# Patient Record
Sex: FEMALE | Race: WHITE
[De-identification: names, ages, dates, MRNs, and addresses within clinical notes are randomized per-mention and may not be internally consistent; named-entity substitution may affect disease eponyms.]

---

## 2018-12-16 ENCOUNTER — HOSPITAL ENCOUNTER (EMERGENCY)
Dept: HOSPITAL 39 - ER | Age: 33
Discharge: HOME | End: 2018-12-16
Payer: COMMERCIAL

## 2018-12-16 VITALS — DIASTOLIC BLOOD PRESSURE: 68 MMHG | TEMPERATURE: 97.9 F | SYSTOLIC BLOOD PRESSURE: 102 MMHG | OXYGEN SATURATION: 98 %

## 2018-12-16 DIAGNOSIS — K52.9: Primary | ICD-10-CM

## 2018-12-16 DIAGNOSIS — F32.9: ICD-10-CM

## 2018-12-16 DIAGNOSIS — E11.9: ICD-10-CM

## 2018-12-16 DIAGNOSIS — E86.0: ICD-10-CM

## 2018-12-16 PROCEDURE — 87086 URINE CULTURE/COLONY COUNT: CPT

## 2018-12-16 PROCEDURE — 85025 COMPLETE CBC W/AUTO DIFF WBC: CPT

## 2018-12-16 PROCEDURE — 74018 RADEX ABDOMEN 1 VIEW: CPT

## 2018-12-16 PROCEDURE — 80053 COMPREHEN METABOLIC PANEL: CPT

## 2018-12-16 PROCEDURE — 84703 CHORIONIC GONADOTROPIN ASSAY: CPT

## 2018-12-16 PROCEDURE — 82150 ASSAY OF AMYLASE: CPT

## 2018-12-16 PROCEDURE — 87502 INFLUENZA DNA AMP PROBE: CPT

## 2018-12-16 PROCEDURE — 81001 URINALYSIS AUTO W/SCOPE: CPT

## 2018-12-16 PROCEDURE — 83690 ASSAY OF LIPASE: CPT

## 2018-12-16 PROCEDURE — 84132 ASSAY OF SERUM POTASSIUM: CPT

## 2018-12-16 PROCEDURE — 36415 COLL VENOUS BLD VENIPUNCTURE: CPT

## 2018-12-16 NOTE — ED.PDOC
History of Present Illness





- General


Chief Complaint: GI Problem


Time Seen by Provider: 18 14:23


Information Source: patient


Exam Limitations: no limitations





- History of Present Illness


Initial Comments: 


patient comes in today for four-day history of nausea, emesis, and diarrhea.  

Patient states the diarrhea started first on Thursday with slight fever and 

chills subjectively.  Patient has gone multiple times throughout all the days.  

No blood or mucus is reported.  Today and yesterday she had considerable nausea 

with 3 bouts of emesis this morning.  Today also her abdomen started having 

pre-significant cramping although no overt sharp pain.  Patient's states she's 

had no recent ill contacts, travel, or questionable by mouth intake.  She is a 

little bit of nasal congestion but no cough.  Some body aches and overall 

weakness.  Patient is otherwise healthy and has no past medical history.  

Patient has no known drug allergies.  Patient does not believe that she is 

pregnant but is not sure.





Abdominal Pain Onset Location: generalized abdomen


Pain Radiation: no radiation


Quality: moderate, cramping


Timing/Duration: days - 4, getting worse


Improving Factors: nothing


Worsening Factors: eating


Associated Symptoms: diarrhea, fever/chills, nausea/vomiting





Review of Systems





- Review of Systems


Constitutional: States: chills, fever, malaise, weakness


EENTM: States: nose congestion.  Denies: eye pain, ear pain, throat pain


Respiratory: States: no symptoms reported.  Denies: cough, short of breath, 

wheezing


Cardiology: States: no symptoms reported.  Denies: chest pain, edema, 

palpitations


Gastrointestinal/Abdominal: States: see HPI, abdominal pain, diarrhea, nausea, 

vomiting


Genitourinary: States: no symptoms reported.  Denies: dysuria, frequency, 

hematuria


Skin: States: no symptoms reported





Past Medical History (General)





- Patient Medical History


Hx Seizures: No


Hx Stroke: No


Hx Asthma: No


Hx of COPD: No


Hx Cardiac Disorders: No


Hx Congestive Heart Failure: No


Hx Pacemaker: No


Hx Hypertension: No


Hx Diabetes: Yes


Hx Renal Disease: No


Hx MRSA: No





- Vaccination History


Hx Tetanus, Diphtheria Vaccination: No


Hx Influenza Vaccination: No


Hx Pneumococcal Vaccination: No





- Social History


Hx Tobacco Use: No


Hx Alcohol Use: No


Hx Substance Use: No


Hx Substance Use Treatment: No


Hx Depression: Yes


Hx Physical Abuse: No


Hx Emotional Abuse: No





- Female History


Hx Last Menstrual Period: 14


Patient Pregnant: No


Expected Date of Delivery:: 10/24/13





Family Medical History





- Family History


  ** Father


Name: Ho Wyatt


Age (years): 78


Living Status: Still Living


Hx Cardiac Disease: Yes


Hx Family Cancer: Yes - Meloma





  ** Mother


Name: Nidia Wyatt


Age (years): 74


Living Status: 


Age at Death (years of age): 74


Cause of Death: breast cancer


Hx Family Asthma: Yes


Hx Family Congestive Heart Failure: Yes


Hx Family Hypertension: Yes


Hx Family Stroke: No


Hx Cardiac Disease: Yes


Hx Family Diabetes: No


Hx Family Cancer: Yes - Breast


Age of Onset (years of age): 68





Physical Exam





- Physical Exam


General Appearance: Alert, Ill Appearing


Eyes, Ears, Nose, Throat Exam: PERRL/EOMI, normal ENT inspection, TMs normal, 

pharynx normal - dry mucous membranes


Neck: non-tender, full range of motion, supple


Respiratory: chest non-tender, lungs clear, normal breath sounds


Cardiovascular/Chest: normal peripheral pulses, regular rate, rhythm, no edema, 

no gallop, no murmur


Peripheral Pulses: No deficit


Gastrointestinal/Abdominal: soft, tenderness - diffuse tenderness with no 

rebound no guarding, hypoactive BS, non distended


Back Exam: normal inspection, no CVA tenderness


Neurologic: alert, oriented x 3


Skin Exam: other - poor skin turgor





Progress





- Progress


Progress: 





18 19:46


patient is feeling much better with no cramping and no emesis since arrival.  

slow return to normal diet and K is now normal. 





- Results/Orders


Results/Orders: 





                                        





18 14:40


URINE CULTURE W/COLONY COUNT Stat 





18 15:10


KCl 40Meq/Ns [NS W/ KCL 40 meq/Liter] 1,000 ml IVS .QD 








                               Laboratory Results











WBC  5.3 K/mm3 (4.8-10.8)   18  14:40    


 


RBC  5.26 M/mm3 (4.20-5.40)   18  14:40    


 


Hgb  15.7 gm/dL (12.0-16.0)   18  14:40    


 


Hct  46.1 % (36.0-47.0)   18  14:40    


 


MCV  87.6 fl (81.0-99.0)   18  14:40    


 


MCH  29.9 pg (27.0-31.0)   18  14:40    


 


MCHC  34.1 g/dL (33.0-37.0)   18  14:40    


 


RDW  13.5 % (11.5-14.5)   18  14:40    


 


Plt Count  223 K/mm3 (130-400)   18  14:40    


 


MPV  8.3 fl (7.40-10.4)   18  14:40    


 


Absolute Neuts (auto)  3.70 K/uL (1.8-6.8)   18  14:40    


 


Absolute Lymphs (auto)  1.10 K/uL (1.0-3.4)   18  14:40    


 


Absolute Monos (auto)  0.50 K/uL (0.2-0.8)   18  14:40    


 


Absolute Eos (auto)  0.00 K/uL (0.0-0.4)   18  14:40    


 


Absolute Basos (auto)  0.00 K/uL (0.0-0.1)   18  14:40    


 


Neutrophils %  69.8 % (42.0-78.0)   18  14:40    


 


Lymphocytes %  20.1 % (20.0-50.0)   18  14:40    


 


Monocytes %  9.1 % (2.0-9.0)  H  18  14:40    


 


Eosinophils %  0.6 % (1.0-5.0)  L  18  14:40    


 


Basophils %  0.4 % (0.0-2.0)   18  14:40    


 


Sodium  133 mmol/L (135-145)  L  18  14:40    


 


Potassium  2.7 mmol/L (3.6-5.0)  L  18  14:40    


 


Chloride  98 mmol/L (101-111)  L  18  14:40    


 


Carbon Dioxide  23 mmol/L (21-31)   18  14:40    


 


Anion Gap  14.7  (12-18)   18  14:40    


 


BUN  13 mg/dL (7-18)   18  14:40    


 


Creatinine  0.92 mg/dL (0.6-1.3)   18  14:40    


 


BUN/Creatinine Ratio  14.1  (10-20)   18  14:40    


 


Random Glucose  121 mg/dL ()  H  18  14:40    


 


Serum Osmolality  267.7 mOsm/L (275-295)  L  18  14:40    


 


Calcium  8.8 mg/dL (8.4-10.2)   18  14:40    


 


Total Bilirubin  1.2 mg/dL (0.2-1.0)  H  18  14:40    


 


AST  53 IU/L (10-42)  H  18  14:40    


 


ALT  52 IU/L (10-60)   18  14:40    


 


Alkaline Phosphatase  58 IU/L ()   18  14:40    


 


Serum Total Protein  7.5 gm/dL (6.4-8.2)   18  14:40    


 


Albumin  4.0 g/dl (3.2-5.5)   18  14:40    


 


Globulin  3.5 gm/dL (2.3-3.5)   18  14:40    


 


Albumin/Globulin Ratio  1.1  (1.1-1.9)   18  14:40    


 


Amylase  38 U/L ()   18  14:40    


 


Lipase  26 U/L (22-51)   18  14:40    


 


Serum HCG, Qual  Negative   18  14:40    


 


Urine Color  Feli  (Yellow)  H  18  14:40    


 


Urine Appearance  Sl cloudy  (Clear)   18  14:40    


 


Urine pH  6.0  (4.5-7.8)   18  14:40    


 


Ur Specific Gravity  1.025  (1.005-1.030)   18  14:40    


 


Urine Protein  >=300 mg/dL H  18  14:40    


 


Urine Glucose (UA)  100 mg/dL (Negative)  H  18  14:40    


 


Urine Ketones  Trace mg/dL (NEGATIVE)   18  14:40    


 


Urine Blood  Trace-intact  (Negative)  H  18  14:40    


 


Urine Nitrite  Negative   18  14:40    


 


Urine Bilirubin  Moderate  (NEGATIVE)   18  14:40    


 


Urine Urobilinogen  >= 8.0 mg/dL (0.2-1.0)  H  18  14:40    


 


Ur Leukocyte Esterase  Moderate  (Negative)  H  18  14:40    


 


Urine RBC  0-1 /hpf  18  14:40    


 


Urine WBC  5-10 /hpf H  18  14:40    


 


Ur Epithelial Cells  20-30 /hpf  18  14:40    


 


Urine Bacteria  Rare   18  14:40    








KUB normal 





Departure





- Departure


Clinical Impression: 


 Gastroenteritis, Dehydration





Disposition: Discharge to Home or Self Care


Condition: Good


Departure Forms:  ED Discharge - Pt. Copy, Patient Portal Self Enrollment


Diet: other - slow return to normal diet as discussed 


Referrals: 


Saul Key MD [Primary Care Provider] - 1-2 Weeks


Prescriptions: 


Ondansetron [Ondansetron Odt] 4 mg PO Q6HRS PRN #10 tab


 PRN Reason: Nausea/Vomiting


Home Medications: 


Ambulatory Orders





Ondansetron [Ondansetron Odt] 4 mg PO Q6HRS PRN #10 tab 18 








Additional Instructions: 


return to ER for intractable emesis, dehydration, increased abdominal pain.

## 2019-02-25 ENCOUNTER — HOSPITAL ENCOUNTER (EMERGENCY)
Dept: HOSPITAL 39 - ER | Age: 34
LOS: 1 days | Discharge: HOME | End: 2019-02-26
Payer: COMMERCIAL

## 2019-02-25 VITALS — TEMPERATURE: 99.2 F

## 2019-02-25 DIAGNOSIS — J45.901: Primary | ICD-10-CM

## 2019-02-25 DIAGNOSIS — F32.9: ICD-10-CM

## 2019-02-25 DIAGNOSIS — E87.6: ICD-10-CM

## 2019-02-25 DIAGNOSIS — E11.9: ICD-10-CM

## 2019-02-25 DIAGNOSIS — K29.00: ICD-10-CM

## 2019-02-25 PROCEDURE — 74177 CT ABD & PELVIS W/CONTRAST: CPT

## 2019-02-25 PROCEDURE — 71275 CT ANGIOGRAPHY CHEST: CPT

## 2019-02-25 PROCEDURE — 80053 COMPREHEN METABOLIC PANEL: CPT

## 2019-02-25 PROCEDURE — 83605 ASSAY OF LACTIC ACID: CPT

## 2019-02-25 PROCEDURE — 83880 ASSAY OF NATRIURETIC PEPTIDE: CPT

## 2019-02-25 PROCEDURE — 94640 AIRWAY INHALATION TREATMENT: CPT

## 2019-02-25 PROCEDURE — 36415 COLL VENOUS BLD VENIPUNCTURE: CPT

## 2019-02-25 PROCEDURE — 87502 INFLUENZA DNA AMP PROBE: CPT

## 2019-02-25 PROCEDURE — 85379 FIBRIN DEGRADATION QUANT: CPT

## 2019-02-25 PROCEDURE — 85025 COMPLETE CBC W/AUTO DIFF WBC: CPT

## 2019-02-25 PROCEDURE — 74019 RADEX ABDOMEN 2 VIEWS: CPT

## 2019-02-25 PROCEDURE — 36600 WITHDRAWAL OF ARTERIAL BLOOD: CPT

## 2019-02-25 PROCEDURE — 81025 URINE PREGNANCY TEST: CPT

## 2019-02-25 PROCEDURE — 93005 ELECTROCARDIOGRAM TRACING: CPT

## 2019-02-25 PROCEDURE — 82553 CREATINE MB FRACTION: CPT

## 2019-02-25 PROCEDURE — 81001 URINALYSIS AUTO W/SCOPE: CPT

## 2019-02-25 PROCEDURE — 82150 ASSAY OF AMYLASE: CPT

## 2019-02-25 PROCEDURE — 83735 ASSAY OF MAGNESIUM: CPT

## 2019-02-25 PROCEDURE — 84443 ASSAY THYROID STIM HORMONE: CPT

## 2019-02-25 PROCEDURE — 82803 BLOOD GASES ANY COMBINATION: CPT

## 2019-02-25 PROCEDURE — 84484 ASSAY OF TROPONIN QUANT: CPT

## 2019-02-25 PROCEDURE — 82550 ASSAY OF CK (CPK): CPT

## 2019-02-25 PROCEDURE — 83690 ASSAY OF LIPASE: CPT

## 2019-02-25 PROCEDURE — 82805 BLOOD GASES W/O2 SATURATION: CPT

## 2019-02-25 NOTE — RAD
CLINICAL HISTORY:  sob, abd pain 



COMPARISON: None.



TECHNIQUE: XR ABDOMEN SUPINE AND ERECT WITH CHEST (ABD ACUTE

SERIES) 2/25/2019 10:42 PM CST



FINDINGS: 



Bowel gas pattern is nonspecific. There are no abnormal

radiopaque foreign bodies or abnormal calcifications. Osseous

structures are grossly unremarkable. The heart is normal in size.

Lungs are clear. Cholecystectomy was performed.



IMPRESSION: 



Nonspecific bowel gas pattern.



Electronically signed by:  Chris Alatorre MD  2/25/2019 11:11 PM

CST Workstation: [id number]

## 2019-02-26 VITALS — SYSTOLIC BLOOD PRESSURE: 114 MMHG | DIASTOLIC BLOOD PRESSURE: 76 MMHG

## 2019-02-26 VITALS — OXYGEN SATURATION: 98 %

## 2019-02-26 NOTE — CT
CLINICAL HISTORY:  cough, sob, elev ddimer 



COMPARISON: None.



TECHNIQUE: CT ABDOMEN PELVIS WITH IV CONTRAST, CT CHEST

ANGIOGRAPHY WITH IV CONTRAST on 2/26/2019 2:37 AM CST. MIPS

reconstructions were generated.



This exam was performed according to our departmental

dose-optimization program, which includes automated exposure

control, adjustment of the mA and/or kV according to patient size

and/or use of iterative reconstruction technique.



FINDINGS: 



Vascular: Thoracic aorta is normal in course and caliber without

aneurysm or dissection. Pulmonary arteries are adequately

opacified. There are no acute or chronic filling defects.

Abdominal aorta is normal in course and caliber without aneurysm.

Pelvic arteries are patent without aneurysm or occlusion.



Chest: The heart is normal in size. There is no pericardial

effusion. Intrathoracic lymph nodes are not enlarged.



There is no pleural effusion, pleural thickening or pneumothorax.

Central airways are patent. Lungs are clear with no

consolidation, mass or interstitial lung disease.



Abdomen: The liver is normal in appearance. There is no biliary

dilatation. Cholecystectomy was performed. The pancreas and

spleen are normal in appearance. The adrenal glands and kidneys

are unremarkable.



There is no free air. There is no retroperitoneal adenopathy.



Pelvis: There is no bowel obstruction. Urinary bladder is

unremarkable. There is no free fluid. Uterus is normal in size.

Appendix is normal.



Skeleton: There are no acute osseous findings. No suspicious bony

lesions.



IMPRESSION: 



No acute inflammatory process.



No aortic dissection or aneurysm. No pulmonary embolus.



Electronically signed by:  Chris Alatorre MD  2/26/2019 3:18 AM CST

Workstation: [id number]

## 2019-02-26 NOTE — CT
CLINICAL HISTORY:  cough, sob, elev ddimer 



COMPARISON: None.



TECHNIQUE: CT ABDOMEN PELVIS WITH IV CONTRAST, CT CHEST

ANGIOGRAPHY WITH IV CONTRAST on 2/26/2019 2:37 AM CST. MIPS

reconstructions were generated.



This exam was performed according to our departmental

dose-optimization program, which includes automated exposure

control, adjustment of the mA and/or kV according to patient size

and/or use of iterative reconstruction technique.



FINDINGS: 



Vascular: Thoracic aorta is normal in course and caliber without

aneurysm or dissection. Pulmonary arteries are adequately

opacified. There are no acute or chronic filling defects.

Abdominal aorta is normal in course and caliber without aneurysm.

Pelvic arteries are patent without aneurysm or occlusion.



Chest: The heart is normal in size. There is no pericardial

effusion. Intrathoracic lymph nodes are not enlarged.



There is no pleural effusion, pleural thickening or pneumothorax.

Central airways are patent. Lungs are clear with no

consolidation, mass or interstitial lung disease.



Abdomen: The liver is normal in appearance. There is no biliary

dilatation. Cholecystectomy was performed. The pancreas and

spleen are normal in appearance. The adrenal glands and kidneys

are unremarkable.



There is no free air. There is no retroperitoneal adenopathy.



Pelvis: There is no bowel obstruction. Urinary bladder is

unremarkable. There is no free fluid. Uterus is normal in size.

Appendix is normal.



Skeleton: There are no acute osseous findings. No suspicious bony

lesions.



IMPRESSION: 



No acute inflammatory process.



No aortic dissection or aneurysm. No pulmonary embolus.



Electronically signed by:  Chris Alatorre MD  2/26/2019 3:18 AM CST

Workstation: [id number]

## 2019-02-26 NOTE — ED.PDOC
History of Present Illness





- General


Chief Complaint: General


Stated Complaint: stomach cramping, coughing,


Time Seen by Provider: 19 22:18


Source: patient


Exam Limitations: no limitations





- History of Present Illness


Initial Comments: 





the patient's a 33-year-old  female presenting to the emergency room 

secondary to sickness for the last 6 weeks at least.  Sickness started with a 

gastroenteritis.  Gastroenteritis resolved after treatment with oral antibiotic 

however the patient has had persistent abdominal cramping and coughing.  The 

patient has been on around the azithromycin and steroid and does have an inhaler

as well without much relief of the coughing.  She isn't coughing and shortness 

of breath and mild feeling of palpitations. Sometimes she has had some chest 

pain with cough. No obvious fever.  No hypoxia.  She has diffuse abdominal 

discomfort. No rebound or peritoneal signs.


Timing/Duration: unsure


Severity: moderate


Improving Factors: nothing


Worsening Factors: nothing


Associated Symptoms: cough, loss of appetite, malaise, nausea/vomiting, 

shortness of breath


Allergies/Adverse Reactions: 


Allergies





NO KNOWN ALLERGY Allergy (Verified 19 22:41)


   








Home Medications: 


Ambulatory Orders





Ondansetron [Ondansetron Odt] 4 mg PO Q6HRS PRN #10 tab 18 











Review of Systems





- Review of Systems


Constitutional: States: malaise


EENTM: States: no symptoms reported


Respiratory: States: cough, short of breath


Cardiology: States: no symptoms reported


Gastrointestinal/Abdominal: States: abdominal pain, nausea, vomiting


Genitourinary: States: no symptoms reported


Musculoskeletal: States: no symptoms reported


Skin: States: no symptoms reported


Neurological: States: no symptoms reported


Endocrine: States: no symptoms reported


All other Systems: No Change from Baseline





Past Medical History (General)





- Patient Medical History


Hx Seizures: No


Hx Stroke: No


Hx Dementia: No


Hx Asthma: No


Hx of COPD: No


Hx Cardiac Disorders: No


Hx Congestive Heart Failure: No


Hx Pacemaker: No


Hx Hypertension: No


Hx Thyroid Disease: No


Hx Diabetes: Yes


Hx Gastroesophageal Reflux: No


Hx Renal Disease: No


Hx Cancer: No


Hx of HIV: No


Hx Hepatitis C: No


Hx MRSA: No


Surgical History: cholecystectomy





- Vaccination History


Hx Tetanus, Diphtheria Vaccination: No


Hx Influenza Vaccination: Yes


Hx Pneumococcal Vaccination: No





- Social History


Hx Tobacco Use: No


Hx Alcohol Use: No


Hx Substance Use: No


Hx Substance Use Treatment: No


Hx Depression: Yes


Hx Physical Abuse: No


Hx Emotional Abuse: No





- Female History


Hx Last Menstrual Period: 14


Patient Pregnant: No


Expected Date of Delivery:: 10/24/13





Family Medical History





- Family History


  ** Father


Name: Ho Wyatt


Age (years): 78


Living Status: Still Living


Hx Cardiac Disease: Yes


Hx Family Cancer: Yes - Meloma





  ** Mother


Name: Nidia Wyatt


Age (years): 74


Living Status: 


Age at Death (years of age): 74


Cause of Death: breast cancer


Hx Family Asthma: Yes


Hx Family Congestive Heart Failure: Yes


Hx Family Hypertension: Yes


Hx Family Stroke: No


Hx Cardiac Disease: Yes


Hx Family Diabetes: No


Hx Family Cancer: Yes - Breast


Age of Onset (years of age): 70





Physical Exam





- Physical Exam


General Appearance: Alert, Comfortable, No apparent distress


Eye Exam: bilateral normal


Ears, Nose, Throat: hearing grossly normal, normal ENT inspection


Neck: full range of motion, supple


Respiratory: no respiratory distress, no accessory muscle use, other - the 

patient has a very coarse cough.


Cardiovascular/Chest: normal peripheral pulses, regular rate, rhythm, no edema


Peripheral Pulses: radial,right: 2+, radial,left: 2+, dorsalis pedis,right: 2+, 

dorsalis pedis,left: 2+


Gastrointestinal/Abdominal: soft, other - diffuse discomfort to palpation but no

palpable mass and no rebound or peritoneal signs


Rectal Exam: deferred


Back Exam: no CVA tenderness, no vertebral tenderness


Extremity: normal range of motion, non-tender, normal inspection, no pedal edema


Neurologic: CNs II-XII nml as tested, alert, normal mood/affect, oriented x 3


Skin Exam: normal color


Comments: 





                               Vital Signs - 24 hr











  19





  22:36 23:14 00:00


 


Temperature 99.2 F  


 


Pulse Rate [ 93 H 97 H 89





monitor]   


 


Respiratory 22  





Rate   


 


Blood Pressure 94/70 81/68 99/63





[Left Arm]   


 


O2 Sat by Pulse 100  95





Oximetry   














  19





  00:30 01:30 02:00


 


Temperature   


 


Pulse Rate [   84





monitor]   


 


Respiratory   18





Rate   


 


Blood Pressure 107/67 97/70 98/62





[Left Arm]   


 


O2 Sat by Pulse 98 96 96





Oximetry   














  1919





  03:06 04:04 04:34


 


Temperature   


 


Pulse Rate [  80 





monitor]   


 


Respiratory  16 





Rate   


 


Blood Pressure  76/43 97/54





[Left Arm]   


 


O2 Sat by Pulse 98  





Oximetry   








with the 2 lowest blood pressures above the cuff was not ideally placed





Progress





- Progress


Progress: 





19 04:39


the patient is a 33-year-old  female presenting to the emergency room 

secondary to persistent cough along with worsening abdominal pain and cramping 

of several weeks' duration.  The patient is already undergone a course of 

antibiotics and steroids.  She seemed to respond well to a nebulizer treatment 

as far as her coughing went.  The patient will be written for a nebulizer 

machine with DuoNeb Nebules to use as needed.  The abdominal cramping is most 

likely due to severe hypokalemia.  The patient has received several doses of po

tassium here orally and IV.  She does need to have a potassium repeated in about

a week.  She is going to be placed on potassium chloride 20 mEq tablets 1 tablet

twice daily.  Additionally the patient likely has some stomach upset related to 

the previous antibiotic and steroid usage.  She is going to be written for some 

Pepcid to take daily for the next month.  She is to avoid taking this medication

at the exact same time as her potassium.  Additionally she'll be written for 

Zofran for as needed use to control any nausea or vomiting.  Source of the 

hypokalemia is uncertain at this point and may yet require further 

investigation.  CT scans of the chest abdomen and pelvis failed to show any 

further definitive pathology.  Blood pressures here today are borderline low but

apparently this is not a new issue.  This should be followed as an outpatient. 

ER warnings are given.  Follow up with primary care doctor next week.





- Results/Orders


Results/Orders: 





                                        





19 22:42


Telemetry .CONTINUOUS shows normal sinus rhythm





19 22:45


EKG STAT showed normal sinus rhythm at 97 bpm.  Mild right axis deviation.  

Shallow T waves.  No definitive U waves.  Normal R-wave progression.  Borderline

QT interval.





acute abdominal series fails to show any new pathology.


CT angiogram of the chest failed to show any significant infiltrate, mass or 

pulmonary embolus.  No aortic pathology.  No evidence of cardiac pathology.


CT scan of abdomen and pelvis with contrast failed to show any acute pathology.





                         Laboratory Results - last 24 hr











  02/19





  22:43 22:51 22:51


 


WBC    7.6


 


RBC    4.24


 


Hgb    12.7


 


Hct    36.8


 


MCV    86.9


 


MCH    30.0


 


MCHC    34.5


 


RDW    13.3


 


Plt Count    277


 


MPV    7.9


 


Absolute Neuts (auto)    5.00


 


Absolute Lymphs (auto)    1.90


 


Absolute Monos (auto)    0.50


 


Absolute Eos (auto)    0.00


 


Absolute Basos (auto)    0.00


 


Neutrophils %    66.8


 


Lymphocytes %    25.2


 


Monocytes %    6.9


 


Eosinophils %    0.6 L


 


Basophils %    0.5


 


D-Dimer, Quantitative   


 


pCO2   


 


pO2   


 


HCO3   


 


ABG pH   


 


ABG O2 Saturation   


 


ABG Base Excess   


 


ABG Deoxyhemoglobin   


 


Oxyhemoglobin %   


 


Carboxyhemoglobin %   


 


Methemoglobin % Sat   


 


Calc Total Hemoglobin   


 


Sodium   135 


 


Potassium   2.7 L 


 


Chloride   105 


 


Carbon Dioxide   21 


 


Anion Gap   11.7 L 


 


BUN   9 


 


Creatinine   0.69 


 


BUN/Creatinine Ratio   13.0 


 


Random Glucose   178 H 


 


Serum Osmolality   273.2 L 


 


Lactic Acid   


 


Calcium   8.3 L 


 


Magnesium   2.0 


 


Total Bilirubin   1.1 H 


 


AST   28 


 


ALT   28 


 


Alkaline Phosphatase   43 


 


Creatine Kinase   73 


 


CK-MB (CK-2)   0.4 


 


CK-MB (CK-2) %   Not Reportable 


 


Troponin I   < 0.02 


 


B-Natriuretic Peptide   < 5.0 


 


Serum Total Protein   6.8 


 


Albumin   3.4 


 


Globulin   3.4 


 


Albumin/Globulin Ratio   1.0 L 


 


Amylase   46 


 


Lipase   37 


 


TSH   3.05 


 


Urine Color   


 


Urine Appearance   


 


Urine pH   


 


Ur Specific Gravity   


 


Urine Protein   


 


Urine Glucose (UA)   


 


Urine Ketones   


 


Urine Blood   


 


Urine Nitrite   


 


Urine Bilirubin   


 


Urine Urobilinogen   


 


Ur Leukocyte Esterase   


 


Urine RBC   


 


Urine WBC   


 


Ur Epithelial Cells   


 


Urine Bacteria   


 


Urine HCG, Qual  Negative  














  19





  22:51 23:00 23:17


 


WBC   


 


RBC   


 


Hgb   


 


Hct   


 


MCV   


 


MCH   


 


MCHC   


 


RDW   


 


Plt Count   


 


MPV   


 


Absolute Neuts (auto)   


 


Absolute Lymphs (auto)   


 


Absolute Monos (auto)   


 


Absolute Eos (auto)   


 


Absolute Basos (auto)   


 


Neutrophils %   


 


Lymphocytes %   


 


Monocytes %   


 


Eosinophils %   


 


Basophils %   


 


D-Dimer, Quantitative  0.69 H*  


 


pCO2   


 


pO2   


 


HCO3   


 


ABG pH   


 


ABG O2 Saturation   


 


ABG Base Excess   


 


ABG Deoxyhemoglobin   


 


Oxyhemoglobin %   


 


Carboxyhemoglobin %   


 


Methemoglobin % Sat   


 


Calc Total Hemoglobin   


 


Sodium   


 


Potassium   


 


Chloride   


 


Carbon Dioxide   


 


Anion Gap   


 


BUN   


 


Creatinine   


 


BUN/Creatinine Ratio   


 


Random Glucose   


 


Serum Osmolality   


 


Lactic Acid   1.8 


 


Calcium   


 


Magnesium   


 


Total Bilirubin   


 


AST   


 


ALT   


 


Alkaline Phosphatase   


 


Creatine Kinase   


 


CK-MB (CK-2)   


 


CK-MB (CK-2) %   


 


Troponin I   


 


B-Natriuretic Peptide   


 


Serum Total Protein   


 


Albumin   


 


Globulin   


 


Albumin/Globulin Ratio   


 


Amylase   


 


Lipase   


 


TSH   


 


Urine Color    Yellow


 


Urine Appearance    Clear


 


Urine pH    6.0


 


Ur Specific Gravity    1.020


 


Urine Protein    Trace


 


Urine Glucose (UA)    Negative


 


Urine Ketones    Negative


 


Urine Blood    Small H


 


Urine Nitrite    Negative


 


Urine Bilirubin    Negative


 


Urine Urobilinogen    0.2


 


Ur Leukocyte Esterase    Negative


 


Urine RBC    3-5 H


 


Urine WBC    1-3


 


Ur Epithelial Cells    3-5


 


Urine Bacteria    Rare


 


Urine HCG, Qual   














  19





  00:45


 


WBC 


 


RBC 


 


Hgb 


 


Hct 


 


MCV 


 


MCH 


 


MCHC 


 


RDW 


 


Plt Count 


 


MPV 


 


Absolute Neuts (auto) 


 


Absolute Lymphs (auto) 


 


Absolute Monos (auto) 


 


Absolute Eos (auto) 


 


Absolute Basos (auto) 


 


Neutrophils % 


 


Lymphocytes % 


 


Monocytes % 


 


Eosinophils % 


 


Basophils % 


 


D-Dimer, Quantitative 


 


pCO2  30 L


 


pO2  90


 


HCO3  20.1


 


ABG pH  7.440


 


ABG O2 Saturation  98.6


 


ABG Base Excess  -2.7


 


ABG Deoxyhemoglobin  1.4


 


Oxyhemoglobin %  96.8


 


Carboxyhemoglobin %  0.1 L


 


Methemoglobin % Sat  1.7 H


 


Calc Total Hemoglobin  11.8 L


 


Sodium 


 


Potassium 


 


Chloride 


 


Carbon Dioxide 


 


Anion Gap 


 


BUN 


 


Creatinine 


 


BUN/Creatinine Ratio 


 


Random Glucose 


 


Serum Osmolality 


 


Lactic Acid 


 


Calcium 


 


Magnesium 


 


Total Bilirubin 


 


AST 


 


ALT 


 


Alkaline Phosphatase 


 


Creatine Kinase 


 


CK-MB (CK-2) 


 


CK-MB (CK-2) % 


 


Troponin I 


 


B-Natriuretic Peptide 


 


Serum Total Protein 


 


Albumin 


 


Globulin 


 


Albumin/Globulin Ratio 


 


Amylase 


 


Lipase 


 


TSH 


 


Urine Color 


 


Urine Appearance 


 


Urine pH 


 


Ur Specific Gravity 


 


Urine Protein 


 


Urine Glucose (UA) 


 


Urine Ketones 


 


Urine Blood 


 


Urine Nitrite 


 


Urine Bilirubin 


 


Urine Urobilinogen 


 


Ur Leukocyte Esterase 


 


Urine RBC 


 


Urine WBC 


 


Ur Epithelial Cells 


 


Urine Bacteria 


 


Urine HCG, Qual 














Departure





- Departure


Clinical Impression: 


 Hypokalemia





Reactive airway disease


Qualifiers:


 Asthma severity: moderate Asthma complication type: with acute exacerbation 





Gastritis


Qualifiers:


 Gastritis type: unspecified gastritis Chronicity: acute Gastritis bleeding: 

without bleeding Qualified Code(s): K29.00 - Acute gastritis without bleeding





Disposition: Discharge to Home or Self Care


Condition: Fair


Departure Forms:  ED Discharge - Pt. Copy, Patient Portal Self Enrollment


Instructions:  Hypokalemia (DC), Gastritis (DC), Asthma, Adult (DC)


Diet: bland diet


Activity: increase activity as tolerated


Referrals: 


Saul Key MD [Primary Care Provider] - 1-2 Weeks


Home Medications: 


Ambulatory Orders





Ondansetron [Ondansetron Odt] 4 mg PO Q6HRS PRN #10 tab 18 








Additional Instructions: 


the patient is a 33-year-old  female presenting to the emergency room 

secondary to persistent cough along with worsening abdominal pain and cramping 

of several weeks' duration.  The patient is already undergone a course of 

antibiotics and steroids.  She seemed to respond well to a nebulizer treatment a

s far as her coughing went.  The patient will be written for a nebulizer machine

with DuoNeb Nebules to use as needed.  The abdominal cramping is most likely due

to severe hypokalemia.  The patient has received several doses of potassium here

orally and IV.  She does need to have a potassium repeated in about a week.  She

is going to be placed on potassium chloride 20 mEq tablets 1 tablet twice daily.

 Additionally the patient likely has some stomach upset related to the previous 

antibiotic and steroid usage.  She is going to be written for some Pepcid to 

take daily for the next month.  She is to avoid taking this medication at the 

exact same time as her potassium.  Additionally she'll be written for Zofran for

as needed use to control any nausea or vomiting.  Source of the hypokalemia is 

uncertain at this point and may yet require further investigation.  CT scans of 

the chest abdomen and pelvis failed to show any further definitive pathology.  

Blood pressures here today are borderline low but apparently this is not a new 

issue.  This should be followed as an outpatient. ER warnings are given.  Follow

up with primary care doctor next week.

## 2020-10-09 ENCOUNTER — HOSPITAL ENCOUNTER (OUTPATIENT)
Dept: HOSPITAL 39 - YCFC.O | Age: 35
End: 2020-10-09
Attending: NURSE PRACTITIONER
Payer: COMMERCIAL

## 2020-10-09 DIAGNOSIS — Z11.59: ICD-10-CM

## 2020-10-09 DIAGNOSIS — Z03.818: Primary | ICD-10-CM

## 2022-07-09 NOTE — RAD
EXAM DESCRIPTION:  Abdomen 1 View



CLINICAL HISTORY:  33 years  Female  n/v abdominal pain 



COMPARISON:  None.



TECHNIQUE:  Single view of the abdomen.



FINDINGS:



The upper abdomen was not entirely included on the film. No

dilated loops of bowel to suggest obstruction.  No significant

calcific densities are identified.  



IMPRESSION:



No acute plain film abnormality is identified.



Electronically signed by:  Dennys Cox MD  12/16/2018 4:02 PM CST

Workstation: [id number]
Ambulatory